# Patient Record
Sex: FEMALE | Race: AMERICAN INDIAN OR ALASKA NATIVE
[De-identification: names, ages, dates, MRNs, and addresses within clinical notes are randomized per-mention and may not be internally consistent; named-entity substitution may affect disease eponyms.]

---

## 2017-01-20 ENCOUNTER — HOSPITAL ENCOUNTER (OUTPATIENT)
Dept: HOSPITAL 5 - MAMMO | Age: 62
Discharge: HOME | End: 2017-01-20
Attending: INTERNAL MEDICINE
Payer: COMMERCIAL

## 2017-01-20 DIAGNOSIS — Z12.31: Primary | ICD-10-CM

## 2017-01-20 PROCEDURE — 77067 SCR MAMMO BI INCL CAD: CPT

## 2017-01-20 PROCEDURE — G0202 SCR MAMMO BI INCL CAD: HCPCS

## 2017-01-20 NOTE — MAMMOGRAPHY REPORT
BILATERAL MAMMOGRAM:



FINDINGS:

There are scattered fibroglandular densities (approximately 25%-50% 

glandular).  No mass, distortion, suspicious calcification, or skin 

change is seen. When compared to prior examinations in 2010 there are 

no significant changes identified.



CAD was utilized.



IMPRESSION:

Negative mammogram.  There is no mammographic evidence of

malignancy.



RECOMMENDATION:

Follow-up per ACS guidelines.



BI-RADS CATEGORY: 1 = Negative



ACR BI-RADS MAMMOGRAPHIC CODES:



0 = Needs additional imaging evaluation; 1 = Negative; 2 = Benign; 3 = 

Probably benign; 4 = Suspicious; 5 = Malignant; 6 = Known biopsy-proven 

malignancy



COMMENT:

      1.   Dense breast tissue, i.e., adenosis, fibrocystic 

            changes, etc., may obscure an underlying neoplasm.

      2.   Approximately 10% of cancers are not detected with

            mammography.

      3.   A negative mammography report should not delay biopsy 

            if a clinically suspicious mass is present.



COMMENT:

Patient follow-up letters are generated in [facility].

## 2017-05-18 ENCOUNTER — HOSPITAL ENCOUNTER (OUTPATIENT)
Dept: HOSPITAL 5 - PET | Age: 62
Discharge: HOME | End: 2017-05-18
Attending: INTERNAL MEDICINE
Payer: COMMERCIAL

## 2017-05-18 DIAGNOSIS — N28.89: ICD-10-CM

## 2017-05-18 DIAGNOSIS — C85.88: Primary | ICD-10-CM

## 2017-05-18 DIAGNOSIS — R59.9: ICD-10-CM

## 2017-05-18 DIAGNOSIS — N28.1: ICD-10-CM

## 2017-05-18 PROCEDURE — 82962 GLUCOSE BLOOD TEST: CPT

## 2017-05-18 PROCEDURE — 78815 PET IMAGE W/CT SKULL-THIGH: CPT

## 2017-05-18 PROCEDURE — A9552 F18 FDG: HCPCS

## 2017-05-19 NOTE — PET REPORT
PET/CT:05/18/17 09:50:00



CLINICAL: Non-Hodgkin's lymphoma.

RADIOPHARMACEUTICAL: 12.135mCi F18-FDG.





COMPARISON: None. 



TECHNIQUE-

Following intravenous injection of F-18 FDG and an approximately 60 

minute uptake period,  CT and PET images from the mid skull to the 

upper thighs were acquired with the patient in the fasted state.  No 

contrast was administered.  The CT protocol used for this PET CT study 

is designed for attenuation correction and anatomic localization of PET 

abnormalities.  This  CT is not desired to produce and cannot 

replace, state-of-the-art diagnostic CT scans with specific imaging 

protocols for different body parts and indications.



Plasma glucose at the time of this test: 89g/dl.



The standardized uptake values (SUV) are normalized to patient body 

weight and indicate the highest activity concentration (SUV max) in a 

given disease site.





FINDINGS:



Brain--Physiologic FDG uptake in the visualized regions of the brain.



Neck--Physiologic FDG uptake .  An FDG avid left submandibular lymph 

node measures 1.2 x 1.0 cm with SUV 4.4.  No jugular chain 

lymphadenopathy.  A right super clavicular lymph node measures 1.4 x 

1.0 cm with SUV 4.5.



Chest--Physiologic FDG uptake in mediastinal blood pool and myocardium.



Lungs--No abnormal uptake.  No pulmonary nodule or mass. 



Pleura/pericardium--No abnormal uptake.



Thoracic nodes--No abnormal uptake.



Hepatobiliary--No abnormal uptake.  Liver background SUV mean, as a 

reference for comparing FDG studies, is 3.6 .  No liver mass.

      

Spleen--No abnormal uptake.  The spleen measures 11.3 cm in length with 

SUV 3.3.



Pancreas--No abnormal uptake.



Adrenal Glands--No abnormal uptake.



Kidneys/Ureters/Bladder--Physiologic uptake.  The kidneys are diffusely 

hypodense with suggestion of too numerous to count benign cysts of 

various sizes.  Scattered bilateral benign renal calcifications.



Abdominopelvic Nodes--Numerous FDG avid intraperitoneal and 

retroperitoneal lymph nodes.  The largest is a left para-aortic lymph 

node measuring 3.5 x 3.8 cm with issued the 10.6.  A right celiac lymph 

node measures 2.2 x 1.8 cm with SUV 5.8.



Bowel/Peritoneum/Mesentery--No abnormal uptake.  However, there  

"jayda" small bowel mesentery.



Pelvic organs--No abnormal uptake.



Bones/Soft Tissues--No abnormal uptake.







IMPRESSION-1.  FDG avid left submandibular, right supraclavicular, 

intraperitoneal and retroperitoneal lymphadenopathy.  2.  No 

splenomegaly.  3.  Multicystic kidneys.

## 2017-07-28 ENCOUNTER — HOSPITAL ENCOUNTER (OUTPATIENT)
Dept: HOSPITAL 5 - CATHLABREC | Age: 62
Discharge: HOME | End: 2017-07-28
Attending: INTERNAL MEDICINE
Payer: COMMERCIAL

## 2017-07-28 VITALS — DIASTOLIC BLOOD PRESSURE: 74 MMHG | SYSTOLIC BLOOD PRESSURE: 161 MMHG

## 2017-07-28 DIAGNOSIS — R59.9: Primary | ICD-10-CM

## 2017-07-28 DIAGNOSIS — Z53.8: ICD-10-CM

## 2017-07-28 DIAGNOSIS — Z85.72: ICD-10-CM

## 2017-07-28 LAB
ALBUMIN SERPL-MCNC: 4.1 G/DL (ref 3.9–5)
ALBUMIN/GLOB SERPL: 1.4 %
ALP SERPL-CCNC: 51 UNITS/L (ref 35–129)
ALT SERPL-CCNC: < 5 UNITS/L (ref 7–56)
ANION GAP SERPL CALC-SCNC: 21 MMOL/L
BASOPHILS NFR BLD AUTO: 0.7 % (ref 0–1.8)
BILIRUB SERPL-MCNC: 0.3 MG/DL (ref 0.1–1.2)
BUN SERPL-MCNC: 48 MG/DL (ref 7–17)
BUN/CREAT SERPL: 8 %
CALCIUM SERPL-MCNC: 8.7 MG/DL (ref 8.4–10.2)
CHLORIDE SERPL-SCNC: 106.3 MMOL/L (ref 98–107)
CO2 SERPL-SCNC: 18 MMOL/L (ref 22–30)
EOSINOPHIL NFR BLD AUTO: 2.8 % (ref 0–4.3)
GLUCOSE SERPL-MCNC: 98 MG/DL (ref 65–100)
HCT VFR BLD CALC: 28 % (ref 30.3–42.9)
HGB BLD-MCNC: 9.2 GM/DL (ref 10.1–14.3)
MCH RBC QN AUTO: 29 PG (ref 28–32)
MCHC RBC AUTO-ENTMCNC: 33 % (ref 30–34)
MCV RBC AUTO: 87 FL (ref 79–97)
PLATELET # BLD: 195 K/MM3 (ref 140–440)
POTASSIUM SERPL-SCNC: 4.5 MMOL/L (ref 3.6–5)
PROT SERPL-MCNC: 7 G/DL (ref 6.3–8.2)
RBC # BLD AUTO: 3.21 M/MM3 (ref 3.65–5.03)
SODIUM SERPL-SCNC: 141 MMOL/L (ref 137–145)
WBC # BLD AUTO: 4.6 K/MM3 (ref 4.5–11)

## 2017-07-28 PROCEDURE — 70490 CT SOFT TISSUE NECK W/O DYE: CPT

## 2017-07-28 PROCEDURE — 80053 COMPREHEN METABOLIC PANEL: CPT

## 2017-07-28 PROCEDURE — 85025 COMPLETE CBC W/AUTO DIFF WBC: CPT

## 2017-07-28 PROCEDURE — 36415 COLL VENOUS BLD VENIPUNCTURE: CPT

## 2017-07-28 PROCEDURE — 83615 LACTATE (LD) (LDH) ENZYME: CPT

## 2017-07-28 NOTE — CAT SCAN REPORT
CT NECK WITHOUT CONTRAST



History: Lymphoma.



Findings:



Helical CT was performed through the neck with sagittal and coronal 

reformatted images. The PET CT dated 5/18/17 was reviewed. There are 

scattered subcentimeter submental and jugular lymph nodes bilaterally. 

2 lymph nodes are borderline enlarged. A right supraclavicular lymph 

node measures up to 1.4 cm. A left supraclavicular lymph node measures 

up to 1.7 cm. Overall these findings appear stable since the previous 

PET/CT.



The salivary glands, laryngeal structures and upper trachea are 

unremarkable. Normal thyroid gland. The imaged brain is unremarkable.



Impression:

Borderline to mildly enlarged bilateral supraclavicular lymph nodes as 

described. Please note that CT guided biopsy was also scheduled for 

today. Unfortunately, one of the CT machines became nonfunctional and 

the biopsy had to be rescheduled. Having said that, I would consider 

CT-guided biopsy of one of the retroperitoneal lymph nodes seen on 

PET/CT performed 5/18/17. The retroperitoneal lymph nodes are much 

larger and would presumably be easier to get diagnostic tissue.

## 2017-08-03 ENCOUNTER — HOSPITAL ENCOUNTER (EMERGENCY)
Dept: HOSPITAL 5 - ED | Age: 62
LOS: 1 days | Discharge: HOME | End: 2017-08-04
Payer: COMMERCIAL

## 2017-08-03 ENCOUNTER — HOSPITAL ENCOUNTER (OUTPATIENT)
Dept: HOSPITAL 5 - CATHLABREC | Age: 62
Discharge: HOME | End: 2017-08-03
Attending: INTERNAL MEDICINE
Payer: COMMERCIAL

## 2017-08-03 VITALS — DIASTOLIC BLOOD PRESSURE: 80 MMHG | SYSTOLIC BLOOD PRESSURE: 155 MMHG

## 2017-08-03 DIAGNOSIS — Z88.0: ICD-10-CM

## 2017-08-03 DIAGNOSIS — G89.18: ICD-10-CM

## 2017-08-03 DIAGNOSIS — R10.30: Primary | ICD-10-CM

## 2017-08-03 DIAGNOSIS — C85.90: Primary | ICD-10-CM

## 2017-08-03 DIAGNOSIS — Z79.899: ICD-10-CM

## 2017-08-03 LAB
ALBUMIN SERPL-MCNC: 4.1 G/DL (ref 3.9–5)
ALBUMIN SERPL-MCNC: 4.3 G/DL (ref 3.9–5)
ALBUMIN/GLOB SERPL: 1.4 %
ALBUMIN/GLOB SERPL: 1.4 %
ALP SERPL-CCNC: 51 UNITS/L (ref 35–129)
ALP SERPL-CCNC: 51 UNITS/L (ref 35–129)
ALT SERPL-CCNC: 5 UNITS/L (ref 7–56)
ALT SERPL-CCNC: < 5 UNITS/L (ref 7–56)
ANION GAP SERPL CALC-SCNC: 19 MMOL/L
ANION GAP SERPL CALC-SCNC: 21 MMOL/L
APTT BLD: 29.2 SEC. (ref 24.2–36.6)
BASOPHILS NFR BLD AUTO: 0.6 % (ref 0–1.8)
BASOPHILS NFR BLD AUTO: 0.6 % (ref 0–1.8)
BILIRUB SERPL-MCNC: 0.5 MG/DL (ref 0.1–1.2)
BILIRUB SERPL-MCNC: 0.6 MG/DL (ref 0.1–1.2)
BUN SERPL-MCNC: 52 MG/DL (ref 7–17)
BUN SERPL-MCNC: 53 MG/DL (ref 7–17)
BUN/CREAT SERPL: 9.28 %
BUN/CREAT SERPL: 9.46 %
CALCIUM SERPL-MCNC: 8.6 MG/DL (ref 8.4–10.2)
CALCIUM SERPL-MCNC: 8.8 MG/DL (ref 8.4–10.2)
CHLORIDE SERPL-SCNC: 105.5 MMOL/L (ref 98–107)
CHLORIDE SERPL-SCNC: 107.3 MMOL/L (ref 98–107)
CO2 SERPL-SCNC: 15 MMOL/L (ref 22–30)
CO2 SERPL-SCNC: 17 MMOL/L (ref 22–30)
EOSINOPHIL NFR BLD AUTO: 1.2 % (ref 0–4.3)
EOSINOPHIL NFR BLD AUTO: 2.6 % (ref 0–4.3)
GLUCOSE SERPL-MCNC: 91 MG/DL (ref 65–100)
GLUCOSE SERPL-MCNC: 92 MG/DL (ref 65–100)
HCT VFR BLD CALC: 29.2 % (ref 30.3–42.9)
HCT VFR BLD CALC: 29.8 % (ref 30.3–42.9)
HGB BLD-MCNC: 9.6 GM/DL (ref 10.1–14.3)
HGB BLD-MCNC: 9.8 GM/DL (ref 10.1–14.3)
INR PPP: 0.97 (ref 0.87–1.13)
LIPASE SERPL-CCNC: 83 UNITS/L (ref 13–60)
MCH RBC QN AUTO: 29 PG (ref 28–32)
MCH RBC QN AUTO: 29 PG (ref 28–32)
MCHC RBC AUTO-ENTMCNC: 33 % (ref 30–34)
MCHC RBC AUTO-ENTMCNC: 33 % (ref 30–34)
MCV RBC AUTO: 89 FL (ref 79–97)
MCV RBC AUTO: 89 FL (ref 79–97)
PLATELET # BLD: 201 K/MM3 (ref 140–440)
PLATELET # BLD: 232 K/MM3 (ref 140–440)
POTASSIUM SERPL-SCNC: 4.8 MMOL/L (ref 3.6–5)
POTASSIUM SERPL-SCNC: 4.8 MMOL/L (ref 3.6–5)
PROT SERPL-MCNC: 7 G/DL (ref 6.3–8.2)
PROT SERPL-MCNC: 7.3 G/DL (ref 6.3–8.2)
RBC # BLD AUTO: 3.28 M/MM3 (ref 3.65–5.03)
RBC # BLD AUTO: 3.36 M/MM3 (ref 3.65–5.03)
SODIUM SERPL-SCNC: 137 MMOL/L (ref 137–145)
SODIUM SERPL-SCNC: 138 MMOL/L (ref 137–145)
WBC # BLD AUTO: 3.8 K/MM3 (ref 4.5–11)
WBC # BLD AUTO: 7.4 K/MM3 (ref 4.5–11)

## 2017-08-03 PROCEDURE — 85610 PROTHROMBIN TIME: CPT

## 2017-08-03 PROCEDURE — 88172 CYTP DX EVAL FNA 1ST EA SITE: CPT

## 2017-08-03 PROCEDURE — 88333 PATH CONSLTJ SURG CYTO XM 1: CPT

## 2017-08-03 PROCEDURE — 85025 COMPLETE CBC W/AUTO DIFF WBC: CPT

## 2017-08-03 PROCEDURE — 88185 FLOWCYTOMETRY/TC ADD-ON: CPT

## 2017-08-03 PROCEDURE — 38505 NEEDLE BIOPSY LYMPH NODES: CPT

## 2017-08-03 PROCEDURE — 77012 CT SCAN FOR NEEDLE BIOPSY: CPT

## 2017-08-03 PROCEDURE — 36415 COLL VENOUS BLD VENIPUNCTURE: CPT

## 2017-08-03 PROCEDURE — 80053 COMPREHEN METABOLIC PANEL: CPT

## 2017-08-03 PROCEDURE — 74176 CT ABD & PELVIS W/O CONTRAST: CPT

## 2017-08-03 PROCEDURE — 88173 CYTOPATH EVAL FNA REPORT: CPT

## 2017-08-03 PROCEDURE — 85730 THROMBOPLASTIN TIME PARTIAL: CPT

## 2017-08-03 PROCEDURE — 88184 FLOWCYTOMETRY/ TC 1 MARKER: CPT

## 2017-08-03 PROCEDURE — 88342 IMHCHEM/IMCYTCHM 1ST ANTB: CPT

## 2017-08-03 PROCEDURE — 49180 BIOPSY ABDOMINAL MASS: CPT

## 2017-08-03 PROCEDURE — 83690 ASSAY OF LIPASE: CPT

## 2017-08-03 PROCEDURE — 88341 IMHCHEM/IMCYTCHM EA ADD ANTB: CPT

## 2017-08-03 PROCEDURE — 83615 LACTATE (LD) (LDH) ENZYME: CPT

## 2017-08-03 PROCEDURE — 88305 TISSUE EXAM BY PATHOLOGIST: CPT

## 2017-08-03 NOTE — EMERGENCY DEPARTMENT REPORT
HPI





- General


Chief Complaint: Abdominal Pain


Time Seen by Provider: 08/03/17 23:36





- HPI


HPI: 





Issues a 62-year-old -American female who presents to ED with left flank 

pain.  Patient states pain started after getting a left kidney biopsy.  Patient 

is renal failure but she has not yet started dialysis.  Issues denies any fever

, chills, night sweats.  The patient was given Ultram to take home she took one 

but that did not resolve her pain.





ED Past Medical Hx





- Past Medical History


Previous Medical History?: Yes


Hx Hypertension: Yes


Hx Renal Disease: Yes


Hx Kidney Stones: Yes


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





- Family History


Family history: hypertension





- Social History


Smoking Status: Never Smoker


Substance Use Type: None





- Medications


Home Medications: 


 Home Medications











 Medication  Instructions  Recorded  Confirmed  Last Taken  Type


 


Acetaminophen/Diphenhydramine 1 each PO QHS 07/28/17 08/03/17 08/02/17 History





[Tylenol Pm Ex-Strength Caplet]    2 tab 


 


Simvastatin [Zocor TAB] 20 mg PO QHS 07/28/17 08/03/17 08/02/17 History





    20mg 


 


amLODIPine [Norvasc] 10 mg PO DAILY 07/28/17 08/03/17 08/02/17 History





    10mg 


 


Calcium 500 + D Tablet 600 mg PO DAILY 08/03/17 08/03/17 08/02/17 History





    1 tab 


 


Mucinex 1 tab PO DAILY 08/03/17 08/03/17 08/02/17 History





    1 tab 


 


Vit E AC/Vit K1/Safflower Oil 400 units PO 2XW 08/03/17 08/03/17 07/31/17 

History





    1 tab 


 


HYDROcodone/APAP  [Norco 1 each PO Q6HR PRN #5 tablet 08/04/17  Unknown Rx





10/325]     














ED Review of Systems


ROS: 


Stated complaint: S/P BX 8/3/17/LEFT SIDE PAIN


Other details as noted in HPI





Comment: All other systems reviewed and negative


Gastrointestinal: abdominal pain





Physical Exam





- Physical Exam


Vital Signs: 


 Vital Signs











  08/03/17





  18:45


 


Temperature 98.6 F


 


Pulse Rate 68


 


Blood Pressure 167/86


 


O2 Sat by Pulse 100





Oximetry 











Physical Exam: 





Gen. alert and oriented 3 in no distress





Head atraumatic normocephalic





Eyes PERR LA EOMI





Chest regular rate and rhythm normal S1-S2 





lungs clear bilaterally





Abdomen soft nondistended





Back no point tenderness paravertebral tenderness





Neuro no focal deficit.





Psych normal mood.





ED Course


 Vital Signs











  08/03/17





  18:45


 


Temperature 98.6 F


 


Pulse Rate 68


 


Blood Pressure 167/86


 


O2 Sat by Pulse 100





Oximetry 














ED Medical Decision Making





- Lab Data


Result diagrams: 


 08/03/17 19:08





 08/03/17 19:08


Critical care attestation.: 


If time is entered above; I have spent that time in minutes in the direct care 

of this critically ill patient, excluding procedure time.








ED Disposition


Clinical Impression: 


 Postoperative pain





Disposition: DC-01 TO HOME OR SELFCARE


Is pt being admited?: No


Does the pt Need Aspirin: No


Condition: Stable


Instructions:  Abdominal Pain (ED)


Prescriptions: 


HYDROcodone/APAP  [[place] 10/325] 1 each PO Q6HR PRN #5 tablet


 PRN Reason: Pain


Referrals: 


JOSE DE JESUS MELVIN MD [Primary Care Provider] - 3-5 Days


Forms:  Accompanied Note

## 2017-08-03 NOTE — CAT SCAN REPORT
CT guided biopsy of retroperitoneal mass.



History: Lymphoma.



Procedure: Initially the upper thorax was scanned. Small 

sternoclavicular nodes were felt to be too small to biopsy. It was 

decided to biopsy a left retroperitoneal enlarged lymph node. The 

patient was placed in a prone position. The skin surface overlying the 

upper abdomen was prepped and draped using sterile technique. Local 

anesthetic was injected into the skin. Using CT guidance, a 17-gauge 

sheath needle was advanced into the left retroperitoneal mass. 3 cores 

of tissue were obtained with an 18-gauge biopsy gun. Adequate tissue 

was obtained. Intravenous conscious sedation was used. Intraservice 

time was 30 minutes. Independent cardiorespiratory monitoring was 

performed by the outpatient procedure nurse for 30 minutes, supervised 

by me. The patient's heart the procedure well clinically. The patient 

was sent to the outpatient procedure unit for further observation. Post 

biopsy images reveal minimal hemorrhage at the biopsy site.

## 2017-08-03 NOTE — SHORT STAY SUMMARY
Short Stay Documentation


Date of service: 08/03/17





- History


Principal diagnosis: Multiple myeloma





- Allergies and Medications


Current Medications: 


 Allergies





Penicillins Adverse Reaction (Verified 07/28/17 08:45)


 Rash





 Home Medications











 Medication  Instructions  Recorded  Confirmed  Last Taken  Type


 


Acetaminophen/Diphenhydramine 1 each PO QHS 07/28/17 08/03/17 08/02/17 History





[Tylenol Pm Ex-Strength Caplet]    2 tab 


 


Simvastatin [Zocor TAB] 20 mg PO QHS 07/28/17 08/03/17 08/02/17 History





    20mg 


 


amLODIPine [Norvasc] 10 mg PO DAILY 07/28/17 08/03/17 08/02/17 History





    10mg 


 


Calcium 500 + D Tablet 600 mg PO DAILY 08/03/17 08/03/17 08/02/17 History





    1 tab 


 


Mucinex 1 tab PO DAILY 08/03/17 08/03/17 08/02/17 History





    1 tab 


 


Vit E AC/Vit K1/Safflower Oil 400 units PO 2XW 08/03/17 08/03/17 07/31/17 

History





    1 tab 














- Physical exam


General appearance: no acute distress





- Brief post op/procedure progress note


Date of procedure: 08/03/17


Pre-op diagnosis: Myeloma


Post-op diagnosis: same


Procedure: 





Bx retroperitoneal node


Anesthesia: local


Surgeon: YOANDY DEL CASTILLO


Estimated blood loss: none


Specimen disposition: to lab


Condition: stable





- Disposition


Condition at discharge: Good


Disposition: DC-01 TO HOME OR SELFCARE

## 2017-08-04 VITALS — SYSTOLIC BLOOD PRESSURE: 158 MMHG | DIASTOLIC BLOOD PRESSURE: 82 MMHG

## 2017-08-04 NOTE — CAT SCAN REPORT
FINAL REPORT



EXAM:  CT ABDOMEN PELVIS WO CON



HISTORY:  left flank pain ,  s/p   biopsy 8/3/17 



TECHNIQUE:  Helical CT scan through the abdomen and pelvis

without contrast. Images are reconstructed in the sagittal and

coronal planes.



PRIORS:  CT images from a biopsy performed on 08/03/2017



FINDINGS:  

Solid organ and bowel evaluation is limited without intravenous

contrast. Bowel evaluation is limited without oral contrast.



There is mild bibasilar subsegmental atelectasis.



There is extensive mesenteric and retroperitoneal adenopathy

without change. There is no evidence of acute bleeding. The

largest lymph node is a periaortic node at the level of the left

mid kidney and measures 4.2 x 3.1 x 4.3 Cm. There is associated

mesenteric fat graying. 



The liver, gallbladder, pancreas, spleen and adrenal glands

appear normal.



The kidneys are diffusely polycystic with scattered hemorrhagic

cysts. 



The pelvic organs appear grossly normal.



The stomach appears grossly within normal limits.



There are no abnormally dilated loops of bowel or acute

inflammatory changes. A normal-appearing appendix is identified.



The abdominal aorta has a normal diameter.



The bones and subcutaneous soft tissues are unremarkable for age.



IMPRESSION:  

No acute hemorrhage after retroperitoneal lymph node biopsy. 



Extensive retroperitoneal and mesenteric adenopathy 



Bilateral polycystic kidneys

## 2017-08-09 LAB
CYTOMETRY FIRST MARKER: (no result)
FLOW CYTOMETRY >16: (no result)

## 2018-06-20 ENCOUNTER — HOSPITAL ENCOUNTER (OUTPATIENT)
Dept: HOSPITAL 5 - VAS | Age: 63
Discharge: HOME | End: 2018-06-20
Attending: INTERNAL MEDICINE
Payer: MEDICARE

## 2018-06-20 DIAGNOSIS — E78.00: ICD-10-CM

## 2018-06-20 DIAGNOSIS — M79.604: Primary | ICD-10-CM

## 2018-06-20 DIAGNOSIS — R60.0: ICD-10-CM

## 2018-06-20 DIAGNOSIS — J18.9: ICD-10-CM

## 2018-06-20 DIAGNOSIS — D64.9: ICD-10-CM

## 2018-06-20 DIAGNOSIS — I10: ICD-10-CM

## 2018-07-12 ENCOUNTER — HOSPITAL ENCOUNTER (OUTPATIENT)
Dept: HOSPITAL 5 - PET | Age: 63
Discharge: HOME | End: 2018-07-12
Attending: INTERNAL MEDICINE
Payer: MEDICARE

## 2018-07-12 DIAGNOSIS — C85.88: Primary | ICD-10-CM

## 2018-07-12 DIAGNOSIS — E78.00: ICD-10-CM

## 2018-07-12 DIAGNOSIS — D64.9: ICD-10-CM

## 2018-07-12 DIAGNOSIS — I10: ICD-10-CM

## 2018-07-12 DIAGNOSIS — R16.1: ICD-10-CM

## 2018-07-12 DIAGNOSIS — Z86.73: ICD-10-CM

## 2018-07-12 DIAGNOSIS — J18.9: ICD-10-CM

## 2018-07-12 PROCEDURE — 82962 GLUCOSE BLOOD TEST: CPT

## 2018-07-12 PROCEDURE — A9552 F18 FDG: HCPCS

## 2018-07-12 PROCEDURE — 78815 PET IMAGE W/CT SKULL-THIGH: CPT

## 2018-07-13 NOTE — PET REPORT
PET/CT:07/12/18 11:53:00



CLINICAL: Non-Hodgkin's lymphoma restaging

RADIOPHARMACEUTICAL: 13.084i F18-FDG.





COMPARISON: 05/18/17 PET/CT



TECHNIQUE-

Following intravenous injection of F-18 FDG and an approximately 60 

minute uptake period,  CT and PET images from the mid skull to the 

upper thighs were acquired with the patient in the fasted state.  No 

contrast was administered.  The CT protocol used for this PET CT study 

is designed for attenuation correction and anatomic localization of PET 

abnormalities.  This  CT is not desired to produce and cannot 

replace, state-of-the-art diagnostic CT scans with specific imaging 

protocols for different body parts and indications.



Plasma glucose at the time of this test: 77g/dl.



The standardized uptake values (SUV) are normalized to patient body 

weight and indicate the highest activity concentration (SUV max) in a 

given disease site.





FINDINGS:



Brain--Physiologic FDG uptake in the visualized regions of the brain.



Neck--Physiologic FDG uptake in mucosal structures.  Extensive 

bilateral FDG avid lymphadenopathy involving levels I through VI.  The 

largest lymph node is a left level IV jugular lymph node measuring 2.9 

x 2.1 cm with SUV 9.0.  Bilateral FDG avid supraclavicular 

lymphadenopathy.  A right clavicular lymph node with SUV 10.4.



Chest--Physiologic FDG uptake in mediastinal blood pool and myocardium.



Lungs--No abnormal uptake.  No pulmonary nodule or mass. 



Pleura/pericardium--No abnormal uptake.



Thoracic nodes--FDG avid subcarinal lymphadenopathy with SUV is 7.9.  

Extensive bilateral FDG avid axillary lymphadenopathy.  Left axillary 

lymph node with SUV 11.7.  



Hepatobiliary--No abnormal uptake.  Liver background SUV mean, as a 

reference for comparing FDG studies, is 3.1 compared to 3.8 on the last 

exam.  No liver mass.

      

Spleen--Splenomegaly with the spleen measuring 16 x 13 x 10 cm.  No 

splenic mass.



Pancreas--No abnormal uptake.



Adrenal Glands--No abnormal uptake.



Kidneys/Ureters/Bladder--No abnormal uptake.



Abdominopelvic Nodes--Massive FDG avid retroperitoneal lymphadenopathy 

extending from the diaphragm to the pelvis.  The most FDG avid lymph 

node and the largest lymph node is at the diaphragm with SUV 14.2.  

Bilateral FDG avid external iliac and internal iliac lymph nodes.





Bowel/Peritoneum/Mesentery--No abnormal uptake.



Pelvic organs--No abnormal uptake.



Bones/Soft Tissues--No abnormal uptake.  No suspicious bone lesions.







IMPRESSION-

Progression of disease with extensive FDG avid lymphadenopathy of the 

neck, chest, abdomen and pelvis.  Splenomegaly.

## 2019-08-14 ENCOUNTER — HOSPITAL ENCOUNTER (OUTPATIENT)
Dept: HOSPITAL 5 - CT | Age: 64
Discharge: HOME | End: 2019-08-14
Attending: INTERNAL MEDICINE
Payer: MEDICARE

## 2019-08-14 DIAGNOSIS — R59.9: ICD-10-CM

## 2019-08-14 DIAGNOSIS — N18.9: ICD-10-CM

## 2019-08-14 DIAGNOSIS — I70.0: Primary | ICD-10-CM

## 2019-08-14 DIAGNOSIS — I12.9: ICD-10-CM

## 2019-08-14 DIAGNOSIS — D63.1: ICD-10-CM

## 2019-08-14 DIAGNOSIS — E78.00: ICD-10-CM

## 2019-08-14 LAB — BUN SERPL-MCNC: 21 MG/DL (ref 7–17)

## 2019-08-14 PROCEDURE — 74177 CT ABD & PELVIS W/CONTRAST: CPT

## 2019-08-14 PROCEDURE — 71260 CT THORAX DX C+: CPT

## 2019-08-14 PROCEDURE — 82565 ASSAY OF CREATININE: CPT

## 2019-08-14 PROCEDURE — 36415 COLL VENOUS BLD VENIPUNCTURE: CPT

## 2019-08-14 PROCEDURE — 84520 ASSAY OF UREA NITROGEN: CPT

## 2019-08-14 NOTE — CAT SCAN REPORT
CT CHEST WITH CONTRAST



INDICATION / CLINICAL INFORMATION: R59. Enlarged lymph [id number] IN CHRONIS KIDNEY DISEASE.



TECHNIQUE:

Axial CT images were obtained through the chest after Omnipaque 300 100 cc IV contrast. Sagittal and 
coronal reformatted images. All CT scans at this location are performed using CT dose reduction for A
JES by means of automated exposure control. 



COMPARISON: CT chest without contrast report dated 12/13/2018



FINDINGS:

HEART: No significant abnormality. 

THORACIC AORTA: Mild dilatation of the ascending aorta is stable at 4.2 cm. 

MEDIASTINUM and RENU: No significant abnormality. No mediastinal mass or adenopathy.

LUNGS: No acute air space or interstitial disease.  No pulmonary nodule or mass.

PLEURA: No significant pleural effusion. No pneumothorax.

SKELETAL SYSTEM: No significant abnormality.





IMPRESSION:

Unremarkable CT chest with contrast.



Signer Name: Rakesh Kinsey Jr, MD 

Signed: 8/14/2019 12:23 PM

 Workstation Name: [id number]

## 2019-08-14 NOTE — CAT SCAN REPORT
CT ABDOMEN AND PELVIS WITH CONTRAST



HISTORY: D63.1 AMENIA IN CHRONIC KIDNEY DISEASE R59.9R59. Enlarged lymph n



COMPARISON: CT abdomen pelvis without contrast 12/12/2018



TECHNIQUE: Axial CT images were obtained through the abdomen and pelvis after 100 cc of Omnipaque 300
 intravenously. Sagittal and coronal reformatted images. All CT scans at this location are performed 
using CT dose reduction for ALARA by means of automated exposure control.





FINDINGS:



CT ABDOMEN:

Liver: No significant abnormality.

Biliary: No significant abnormality.

Spleen: No significant abnormality.  Unenlarged.

Pancreas: No significant abnormality.

Adrenals: No significant abnormality.

Kidneys: The kidneys are mildly enlarged with too many to count renal cysts ranging from 1-3 cm in di
ameter. A few of the cysts demonstrate focal rim calcifications and/or mild hemorrhagic change. No ev
idence for hypervascular renal mass or obstruction. The ureters are normal course and caliber.

Lymphatics: The previously described mesenteric and left para-aortic lymph nodes appears slightly dec
reased in size to previous exam. No new or suspicious adenopathy has developed. Silvia edema in the me
sentery is unchanged and could be related to previous therapy or panniculitis.

Vasculature: Moderate diffuse aortic calcifications. No aneurysm. 

Bowel/Peritoneum: No significant abnormality. No free air. No free fluid. Normal appendix.



CT PELVIS:

: No significant abnormality.



Osseous Structures: No significant abnormality.

Additional Findings: None



IMPRESSION:

Mild improvement in the mesenteric and left para-aortic lymph nodes since 12/13/2018. No new or suspi
cious adenopathy.

Numerous bilateral renal cysts suggesting autosomal dominant polycystic kidney disease.

Otherwise stable findings since 12/13/2018 exam.



Signer Name: Rakesh Kinsey Jr, MD 

Signed: 8/14/2019 12:27 PM

 Workstation Name: [id number]

## 2019-08-24 ENCOUNTER — HOSPITAL ENCOUNTER (EMERGENCY)
Dept: HOSPITAL 5 - ED | Age: 64
Discharge: HOME | End: 2019-08-24
Payer: MEDICARE

## 2019-08-24 VITALS — SYSTOLIC BLOOD PRESSURE: 175 MMHG | DIASTOLIC BLOOD PRESSURE: 84 MMHG

## 2019-08-24 DIAGNOSIS — Z79.899: ICD-10-CM

## 2019-08-24 DIAGNOSIS — Z88.0: ICD-10-CM

## 2019-08-24 DIAGNOSIS — F12.90: ICD-10-CM

## 2019-08-24 DIAGNOSIS — I10: ICD-10-CM

## 2019-08-24 DIAGNOSIS — Z00.00: Primary | ICD-10-CM

## 2019-08-24 PROCEDURE — 99282 EMERGENCY DEPT VISIT SF MDM: CPT

## 2019-08-24 NOTE — EMERGENCY DEPARTMENT REPORT
ED General Adult HPI





- General


Chief complaint: Medical Clearance


Stated complaint: BLOOD POISION


Time Seen by Provider: 08/24/19 10:09


Source: patient


Mode of arrival: Ambulatory


Limitations: No Limitations





- History of Present Illness


Initial comments: 





Patient presents with variety of complaints.  Reports she saw her PCP yesterday 

for all except that she now believes today that she has a blood infection.  

Reports she cleans where gnats are present and that they could be inside her 

blood.  Reports talking with her PCP yesterday about runny nose, cough, and a 

subconjunctival hematoma which her PCP evaluated and recommended zyrtec and an 

expectorant.  Reports she saw her nephrologist who prescribed her an 

azithromycin for same.  Reports she has hx of heart murmur and that she is 

worried that her PCP said that she has "quite the heart murmur" during the exam 

yesterday which the PCP never described the heart murmur in that manner.  





- Related Data


                                Home Medications











 Medication  Instructions  Recorded  Confirmed  Last Taken


 


Acetaminophen/Diphenhydramine 1 each PO QHS 07/28/17 08/03/17 08/02/17





[Tylenol Pm Ex-Strength Caplet]    2 tab


 


Simvastatin (Nf) [Zocor TAB] 20 mg PO QHS 07/28/17 08/03/17 08/02/17





    20mg


 


amLODIPine [Norvasc] 10 mg PO DAILY 07/28/17 08/03/17 08/02/17





    10mg


 


Calcium 500 + D Tablet 600 mg PO DAILY 08/03/17 08/03/17 08/02/17





    1 tab


 


Mucinex 1 tab PO DAILY 08/03/17 08/03/17 08/02/17





    1 tab


 


Vit E AC/Vit K1/Safflower Oil 400 units PO 2XW 08/03/17 08/03/17 07/31/17





    1 tab








                                  Previous Rx's











 Medication  Instructions  Recorded  Last Taken  Type


 


HYDROcodone/APAP  [Norco 1 each PO Q6HR PRN #5 tablet 08/04/17 Unknown Rx





10/325]    











                                    Allergies











Allergy/AdvReac Type Severity Reaction Status Date / Time


 


Penicillins AdvReac  Rash Verified 08/03/17 18:45














ED Review of Systems


ROS: 


Stated complaint: BLOOD POISION


Other details as noted in HPI





Other: 





GENERAL: No weight change, fatigue, fever, chills, or night sweats


SKIN: No changes in skin or hair, no itching, no rashes, no jaundice


HEAD: No trauma, headache, or visual changes


EYES: No blurriness, tearing, itching, acute visual loss, conjunctival 

discoloration, or scleral icterus


EARS: No hearing loss,  tinnitus, vertigo, or earache


NOSE: rhinorrhea, stuffiness, 


MOUTH: No bleeding gums, hoarseness, sore throat, or swelling


CARDIAC: No new murmur, chest pain, palpitations, dyspnea on exertion, 

orthopnea, PND, or edema


RESPIRATORY: No shortness of breath, wheeze, cough, sputum production, 

hemoptysis, pneumonia, asthma, bronchitis, or emphysema


GI: No change in appetite, nausea, vomiting,  dysphagia, diarrhea, constipation,

hematemesis, melena, hematochezia, or abdominal pain


URINARY: No frequency, urgency, polyuria, dysuria, hematuria, or incontinence


MUSCULOSKELETAL: No muscle weakness, joint stiffness, decrease in range of 

motion, redness, swelling


NEUROLOGIC: No headache, loss of sensation, numbness, tingling, tremors, 

weakness, paralysis, seizures


HEMATOLOGIC: No anemia, easy bruising, bleeding, petechiae, or purpura


ENDOCRINE: No hot or cold intolerance, sweating, polyuria, polydipsia or, 

polyphagia no thyroid problems


PSYCHIATRIC: Anxiety worry. No change in mood, no depression





ED Past Medical Hx





- Past Medical History


Previous Medical History?: Yes


Hx Hypertension: Yes


Hx Renal Disease: Yes


Hx Kidney Stones: Yes


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





- Social History


Smoking Status: Never Smoker


Substance Use Type: Alcohol, Marijuana





- Medications


Home Medications: 


                                Home Medications











 Medication  Instructions  Recorded  Confirmed  Last Taken  Type


 


Acetaminophen/Diphenhydramine 1 each PO QHS 07/28/17 08/03/17 08/02/17 History





[Tylenol Pm Ex-Strength Caplet]    2 tab 


 


Simvastatin (Nf) [Zocor TAB] 20 mg PO QHS 07/28/17 08/03/17 08/02/17 History





    20mg 


 


amLODIPine [Norvasc] 10 mg PO DAILY 07/28/17 08/03/17 08/02/17 History





    10mg 


 


Calcium 500 + D Tablet 600 mg PO DAILY 08/03/17 08/03/17 08/02/17 History





    1 tab 


 


Mucinex 1 tab PO DAILY 08/03/17 08/03/17 08/02/17 History





    1 tab 


 


Vit E AC/Vit K1/Safflower Oil 400 units PO 2XW 08/03/17 08/03/17 07/31/17 

History





    1 tab 


 


HYDROcodone/APAP  [Norco 1 each PO Q6HR PRN #5 tablet 08/04/17  Unknown Rx





10/325]     














ED Physical Exam





- General


Limitations: No Limitations





- Other


Other exam information: 





GENERAL: Patient in no acute distress


HEAD: Normocephalic, atraumatic


EYES: Left eye subconjunctival hematoma.  PERRLA, EOM intact, no scleral 

icterus, visual fields and acuity wnl


NOSE: No tenderness, discharge, sinus tenderness


MOUTH: No erythema, bleeding, exudate


HEART: Systolic flow murmur.   Regular rate and rhythm, S1-S2 are auscultated, 

pulses are symmetric


LUNGS: Bilateral breath sounds, No tachypnea, No retractions, No wheezing, 

rales, rhonchi


ABDOMEN: Normal bowel sounds, abdomen soft, no tenderness, no rebound, no 

guarding, no distention, no masses, no CVA tenderness


MUSCULOSKELETAL: RUE fistula positive thrill.  Normal joint range of motion, no 

redness, no swelling, no tenderness


NEUROLOGIC: GCS 15, Alert and Oriented x3, Cranial nerves intact, normal 

sensation, normal strength, normal gait, no cerebellar deficit, NIHSS 0


PSYCHIATRIC: Anxiety.  No homicidal or suicidal ideation, no depression, no 

hallucinations


SKIN: Skin is warm and dry, no wounds, no rashes








ED Course





                                   Vital Signs











  08/24/19





  09:05


 


Temperature 98.4 F


 


Pulse Rate 77


 


Respiratory 20





Rate 


 


Blood Pressure 175/84


 


O2 Sat by Pulse 98





Oximetry 














ED Medical Decision Making





- Medical Decision Making





Patient comfortable.  Reassurance.  Encouraged patient to go to HD today.  She 

goes TTS.  Plan discharge with outpatient follow up.  Return if any worsening. 





Critical care attestation.: 


If time is entered above; I have spent that time in minutes in the direct care 

of this critically ill patient, excluding procedure time.








ED Disposition


Clinical Impression: 


 Normal exam





Disposition: DC-01 TO HOME OR SELFCARE


Is pt being admited?: No


Condition: Stable


Instructions:  Normal Exam (ED)


Referrals: 


JULIANA POST MD [Primary Care Provider] - 2-3 Days


Time of Disposition: 10:30